# Patient Record
Sex: MALE | Race: BLACK OR AFRICAN AMERICAN | URBAN - METROPOLITAN AREA
[De-identification: names, ages, dates, MRNs, and addresses within clinical notes are randomized per-mention and may not be internally consistent; named-entity substitution may affect disease eponyms.]

---

## 2018-07-02 ENCOUNTER — IMPORTED ENCOUNTER (OUTPATIENT)
Dept: URBAN - METROPOLITAN AREA CLINIC 50 | Facility: CLINIC | Age: 48
End: 2018-07-02

## 2019-02-20 ENCOUNTER — IMPORTED ENCOUNTER (OUTPATIENT)
Dept: URBAN - METROPOLITAN AREA CLINIC 50 | Facility: CLINIC | Age: 49
End: 2019-02-20

## 2019-02-21 ENCOUNTER — IMPORTED ENCOUNTER (OUTPATIENT)
Dept: URBAN - METROPOLITAN AREA CLINIC 50 | Facility: CLINIC | Age: 49
End: 2019-02-21

## 2019-02-21 NOTE — PATIENT DISCUSSION
"""Patient very symptomatic for eye irritation OU. States he constantly has to pluck lashes.  Will ""

## 2019-03-21 ENCOUNTER — IMPORTED ENCOUNTER (OUTPATIENT)
Dept: URBAN - METROPOLITAN AREA CLINIC 50 | Facility: CLINIC | Age: 49
End: 2019-03-21

## 2019-03-21 NOTE — PATIENT DISCUSSION
"""discussed with patient that an Frutoso Jose procedure could be done but it would not be affective.  ""

## 2019-10-08 ENCOUNTER — ON CAMPUS - OUTPATIENT (OUTPATIENT)
Dept: URBAN - METROPOLITAN AREA HOSPITAL 82 | Facility: HOSPITAL | Age: 49
End: 2019-10-08

## 2019-10-08 DIAGNOSIS — D12.0 BENIGN NEOPLASM OF CECUM: ICD-10-CM

## 2019-10-08 PROCEDURE — 99203 OFFICE O/P NEW LOW 30 MIN: CPT | Performed by: INTERNAL MEDICINE

## 2019-10-09 ENCOUNTER — ON CAMPUS - OUTPATIENT (OUTPATIENT)
Dept: URBAN - METROPOLITAN AREA HOSPITAL 82 | Facility: HOSPITAL | Age: 49
End: 2019-10-09

## 2019-10-09 DIAGNOSIS — K63.5 POLYP OF COLON: ICD-10-CM

## 2019-10-09 PROCEDURE — 45380 COLONOSCOPY AND BIOPSY: CPT | Performed by: INTERNAL MEDICINE

## 2019-10-16 ENCOUNTER — OFFICE (OUTPATIENT)
Dept: URBAN - METROPOLITAN AREA CLINIC 12 | Facility: CLINIC | Age: 49
End: 2019-10-16

## 2019-10-16 VITALS
SYSTOLIC BLOOD PRESSURE: 149 MMHG | WEIGHT: 273 LBS | DIASTOLIC BLOOD PRESSURE: 81 MMHG | HEART RATE: 77 BPM | HEIGHT: 73 IN

## 2019-10-16 DIAGNOSIS — L29.9 PRURITUS, UNSPECIFIED: ICD-10-CM

## 2019-10-16 DIAGNOSIS — R63.5 ABNORMAL WEIGHT GAIN: ICD-10-CM

## 2019-10-16 DIAGNOSIS — R11.2 NAUSEA WITH VOMITING, UNSPECIFIED: ICD-10-CM

## 2019-10-16 DIAGNOSIS — R14.0 ABDOMINAL DISTENSION (GASEOUS): ICD-10-CM

## 2019-10-16 DIAGNOSIS — E11.9 TYPE 2 DIABETES MELLITUS WITHOUT COMPLICATIONS: ICD-10-CM

## 2019-10-16 DIAGNOSIS — R52 PAIN, UNSPECIFIED: ICD-10-CM

## 2019-10-16 LAB
HBSAG SCREEN: NEGATIVE
HBV REAL-TIME PCR, QUANT: HBV IU/ML: (no result) IU/ML
HBV REAL-TIME PCR, QUANT: LOG10 HBV IU/ML: (no result) LOG10 IU/ML
HBV REAL-TIME PCR, QUANT: TEST INFORMATION: (no result)
HCV ANTIBODY CASCADE(PCR/GENO): HCV AB: <0.1 S/CO RATIO
HEMOGLOBIN A1C: 6.4 % — HIGH (ref 4.8–5.6)
HEP A AB, TOTAL: POSITIVE
HEP B CORE AB, TOT: POSITIVE
HEPATIC FUNCTION PANEL (7): ALBUMIN: 4.3 G/DL (ref 3.5–5.5)
HEPATIC FUNCTION PANEL (7): ALKALINE PHOSPHATASE: 103 IU/L (ref 39–117)
HEPATIC FUNCTION PANEL (7): ALT (SGPT): 58 IU/L — HIGH (ref 0–44)
HEPATIC FUNCTION PANEL (7): AST (SGOT): 30 IU/L (ref 0–40)
HEPATIC FUNCTION PANEL (7): BILIRUBIN, DIRECT: 0.11 MG/DL (ref 0–0.4)
HEPATIC FUNCTION PANEL (7): BILIRUBIN, TOTAL: 0.3 MG/DL (ref 0–1.2)
HEPATIC FUNCTION PANEL (7): PROTEIN, TOTAL: 7.4 G/DL (ref 6–8.5)
HEPATITIS B SURF AB QUANT: >1000 MIU/ML
INTERPRETATION: (no result)
PANEL 083935: HIV SCREEN 4TH GENERATION WRFX: NON REACTIVE

## 2019-10-16 PROCEDURE — 99214 OFFICE O/P EST MOD 30 MIN: CPT | Performed by: INTERNAL MEDICINE

## 2019-10-16 RX ORDER — ONDANSETRON HYDROCHLORIDE 4 MG/1
TABLET, FILM COATED ORAL
Qty: 30 | Refills: -1 | Status: ACTIVE
Start: 2019-10-16

## 2019-10-16 NOTE — SERVICENOTES
He seems to be frustrated, has a flat affect and attributes all his multisystem symptoms (itching of the eyes/skin/aches and pains/nausea vomiting) to hepatitis-B and C.

## 2019-10-16 NOTE — SERVICEHPINOTES
49-year-old  male for hospital follow-up.  he complains of itching and burning throughout his body,  complains of or redness in his eyes,  aches and pains  throughout his body.  he  thinks his symptoms are related to hepatitis-B and C .  he has these symptoms going on for about 2 years.  he has seen a physician as well as gone to multiple hospitals in [place]  but '' no one seems to know the issue''. BR he also complains of nausea and vomiting  going on  ' every day '  for the past 2 years . he has no weight loss but actually has gained weight. he has underlying diabetes and thinks that he has diabetic neuropathy also.  Does not take any medication .  does not seem to know his hemoglobin A1c. About a year and a half ago he had a colonoscopy in [place] and according to him had an upper endoscopy also and was told that it was all normal. He said that he was told that there is nothing wrong with him . Admitted to the hospital [facility], [place], operated by [facility] recently after he presented there as he was told to get urgent care for hepatitis-B and C by health department [facility] according to the patient.  I am not sure if any testing was done to confirm this while he was in the hospital as we do not have those labs.  he also had some change of bowel habits with sometime constipation and sometime mild diarrhea due to which she underwent a  CT scan which revealed a soft tissue mass in the cecum -  subsequent colonoscopy  showed small hyperplastic polyps in the rectum but no cecal mass. OSWALDO

## 2021-04-17 ASSESSMENT — VISUAL ACUITY
OS_SC: 20/20-1
OS_CC: J1-
OS_SC: 20/20
OD_SC: 20/25-1
OD_SC: 20/25
OD_CC: J1-

## 2021-04-17 ASSESSMENT — TONOMETRY
OS_IOP_MMHG: 17
OD_IOP_MMHG: 15
OD_IOP_MMHG: 14
OS_IOP_MMHG: 16

## 2021-08-24 NOTE — PATIENT DISCUSSION
Cataract surgery has been performed in the first eye and activities of daily living are still impaired. The patient would like to proceed with cataract surgery in the second eye as scheduled. The patient elects Basic IOL OD, goal erin.